# Patient Record
Sex: MALE | Race: WHITE | Employment: UNEMPLOYED | RURAL
[De-identification: names, ages, dates, MRNs, and addresses within clinical notes are randomized per-mention and may not be internally consistent; named-entity substitution may affect disease eponyms.]

---

## 2021-09-11 ENCOUNTER — APPOINTMENT (OUTPATIENT)
Dept: CT IMAGING | Age: 86
End: 2021-09-11
Attending: FAMILY MEDICINE
Payer: MEDICARE

## 2021-09-11 ENCOUNTER — HOSPITAL ENCOUNTER (OUTPATIENT)
Age: 86
Setting detail: OBSERVATION
Discharge: HOME | End: 2021-09-12
Attending: FAMILY MEDICINE | Admitting: FAMILY MEDICINE
Payer: MEDICARE

## 2021-09-11 ENCOUNTER — APPOINTMENT (OUTPATIENT)
Dept: GENERAL RADIOLOGY | Age: 86
End: 2021-09-11
Attending: FAMILY MEDICINE
Payer: MEDICARE

## 2021-09-11 DIAGNOSIS — R09.02 HYPOXIA: Primary | ICD-10-CM

## 2021-09-11 LAB
ALBUMIN SERPL-MCNC: 3.6 G/DL (ref 3.5–5)
ALBUMIN/GLOB SERPL: 1.3 {RATIO} (ref 1.1–2.2)
ALP SERPL-CCNC: 138 U/L (ref 45–117)
ALT SERPL-CCNC: 28 U/L (ref 12–78)
ANION GAP SERPL CALC-SCNC: 11 MMOL/L (ref 5–15)
AST SERPL-CCNC: 36 U/L (ref 15–37)
BASOPHILS # BLD: 0 K/UL (ref 0–0.1)
BASOPHILS NFR BLD: 0 % (ref 0–1)
BILIRUB SERPL-MCNC: 1.3 MG/DL (ref 0.2–1)
BUN SERPL-MCNC: 19 MG/DL (ref 6–20)
BUN/CREAT SERPL: 19 (ref 12–20)
CALCIUM SERPL-MCNC: 8.4 MG/DL (ref 8.5–10.1)
CHLORIDE SERPL-SCNC: 101 MMOL/L (ref 97–108)
CO2 SERPL-SCNC: 27 MMOL/L (ref 21–32)
CREAT SERPL-MCNC: 1 MG/DL (ref 0.7–1.3)
D DIMER PPP FEU-MCNC: 19.77 MG/L FEU (ref 0–0.65)
DIFFERENTIAL METHOD BLD: ABNORMAL
EOSINOPHIL # BLD: 0.2 K/UL (ref 0–0.4)
EOSINOPHIL NFR BLD: 2 % (ref 0–7)
ERYTHROCYTE [DISTWIDTH] IN BLOOD BY AUTOMATED COUNT: 14.2 % (ref 11.5–14.5)
FLUAV RNA SPEC QL NAA+PROBE: NOT DETECTED
FLUBV RNA SPEC QL NAA+PROBE: NOT DETECTED
GLOBULIN SER CALC-MCNC: 2.8 G/DL (ref 2–4)
GLUCOSE SERPL-MCNC: 181 MG/DL (ref 65–100)
HCT VFR BLD AUTO: 46.5 % (ref 36.6–50.3)
HGB BLD-MCNC: 16.3 G/DL (ref 12.1–17)
IMM GRANULOCYTES # BLD AUTO: 0.1 K/UL (ref 0–0.04)
IMM GRANULOCYTES NFR BLD AUTO: 1 % (ref 0–0.5)
LYMPHOCYTES # BLD: 1.1 K/UL (ref 0.8–3.5)
LYMPHOCYTES NFR BLD: 8 % (ref 12–49)
MCH RBC QN AUTO: 29.9 PG (ref 26–34)
MCHC RBC AUTO-ENTMCNC: 35.1 G/DL (ref 30–36.5)
MCV RBC AUTO: 85.2 FL (ref 80–99)
MONOCYTES # BLD: 0.5 K/UL (ref 0–1)
MONOCYTES NFR BLD: 4 % (ref 5–13)
NEUTS SEG # BLD: 10.6 K/UL (ref 1.8–8)
NEUTS SEG NFR BLD: 85 % (ref 32–75)
NRBC # BLD: 0 K/UL (ref 0–0.01)
NRBC BLD-RTO: 0 PER 100 WBC
PLATELET # BLD AUTO: 179 K/UL (ref 150–400)
PMV BLD AUTO: 10.4 FL (ref 8.9–12.9)
POTASSIUM SERPL-SCNC: 3.9 MMOL/L (ref 3.5–5.1)
PROT SERPL-MCNC: 6.4 G/DL (ref 6.4–8.2)
RBC # BLD AUTO: 5.46 M/UL (ref 4.1–5.7)
SARS-COV-2, COV2: NOT DETECTED
SODIUM SERPL-SCNC: 139 MMOL/L (ref 136–145)
TROPONIN I SERPL-MCNC: <0.05 NG/ML
TROPONIN I SERPL-MCNC: <0.05 NG/ML
WBC # BLD AUTO: 12.5 K/UL (ref 4.1–11.1)

## 2021-09-11 PROCEDURE — 99285 EMERGENCY DEPT VISIT HI MDM: CPT

## 2021-09-11 PROCEDURE — 87636 SARSCOV2 & INF A&B AMP PRB: CPT

## 2021-09-11 PROCEDURE — 36415 COLL VENOUS BLD VENIPUNCTURE: CPT

## 2021-09-11 PROCEDURE — 93005 ELECTROCARDIOGRAM TRACING: CPT

## 2021-09-11 PROCEDURE — 80053 COMPREHEN METABOLIC PANEL: CPT

## 2021-09-11 PROCEDURE — 71275 CT ANGIOGRAPHY CHEST: CPT

## 2021-09-11 PROCEDURE — 71045 X-RAY EXAM CHEST 1 VIEW: CPT

## 2021-09-11 PROCEDURE — 85379 FIBRIN DEGRADATION QUANT: CPT

## 2021-09-11 PROCEDURE — 74011000636 HC RX REV CODE- 636: Performed by: FAMILY MEDICINE

## 2021-09-11 PROCEDURE — 84484 ASSAY OF TROPONIN QUANT: CPT

## 2021-09-11 PROCEDURE — 85025 COMPLETE CBC W/AUTO DIFF WBC: CPT

## 2021-09-11 RX ORDER — SODIUM CHLORIDE 0.9 % (FLUSH) 0.9 %
5-10 SYRINGE (ML) INJECTION ONCE
Status: COMPLETED | OUTPATIENT
Start: 2021-09-11 | End: 2021-09-11

## 2021-09-11 RX ORDER — ATORVASTATIN CALCIUM 80 MG/1
80 TABLET, FILM COATED ORAL DAILY
COMMUNITY

## 2021-09-11 RX ORDER — AMLODIPINE BESYLATE 5 MG/1
5 TABLET ORAL DAILY
COMMUNITY

## 2021-09-11 RX ORDER — TAMSULOSIN HYDROCHLORIDE 0.4 MG/1
0.4 CAPSULE ORAL DAILY
COMMUNITY

## 2021-09-11 RX ORDER — ASPIRIN 325 MG
325 TABLET ORAL DAILY
COMMUNITY

## 2021-09-11 RX ORDER — FINASTERIDE 5 MG/1
5 TABLET, FILM COATED ORAL DAILY
COMMUNITY

## 2021-09-11 RX ADMIN — Medication 10 ML: at 01:25

## 2021-09-11 RX ADMIN — IOPAMIDOL 100 ML: 755 INJECTION, SOLUTION INTRAVENOUS at 22:31

## 2021-09-11 NOTE — Clinical Note
Patient Class[de-identified] OBSERVATION [104]   Type of Bed: Medical [8]   Cardiac Monitoring Required?: Yes   Reason for Observation: hypoxia   Admitting Diagnosis: Hypoxia [528640]   Admitting Physician: Temitope Flores   Attending Physician: Temitope Flores

## 2021-09-12 VITALS
WEIGHT: 187.2 LBS | HEART RATE: 60 BPM | SYSTOLIC BLOOD PRESSURE: 132 MMHG | HEIGHT: 70 IN | RESPIRATION RATE: 20 BRPM | TEMPERATURE: 97.5 F | OXYGEN SATURATION: 94 % | BODY MASS INDEX: 26.8 KG/M2 | DIASTOLIC BLOOD PRESSURE: 62 MMHG

## 2021-09-12 PROBLEM — I10 HTN (HYPERTENSION): Chronic | Status: ACTIVE | Noted: 2021-09-12

## 2021-09-12 PROBLEM — R09.02 HYPOXIA: Status: ACTIVE | Noted: 2021-09-12

## 2021-09-12 PROBLEM — I67.9 CVD (CEREBROVASCULAR DISEASE): Chronic | Status: ACTIVE | Noted: 2021-09-12

## 2021-09-12 PROBLEM — N40.0 BPH (BENIGN PROSTATIC HYPERPLASIA): Chronic | Status: ACTIVE | Noted: 2021-09-12

## 2021-09-12 PROBLEM — E78.5 HYPERLIPEMIA: Chronic | Status: ACTIVE | Noted: 2021-09-12

## 2021-09-12 LAB
ATRIAL RATE: 95 BPM
CALCULATED P AXIS, ECG09: 59 DEGREES
CALCULATED R AXIS, ECG10: 92 DEGREES
CALCULATED T AXIS, ECG11: 51 DEGREES
DIAGNOSIS, 93000: NORMAL
ERYTHROCYTE [DISTWIDTH] IN BLOOD BY AUTOMATED COUNT: 14.7 % (ref 11.5–14.5)
HCT VFR BLD AUTO: 40.7 % (ref 36.6–50.3)
HGB BLD-MCNC: 14 G/DL (ref 12.1–17)
MCH RBC QN AUTO: 29.6 PG (ref 26–34)
MCHC RBC AUTO-ENTMCNC: 34.4 G/DL (ref 30–36.5)
MCV RBC AUTO: 86 FL (ref 80–99)
P-R INTERVAL, ECG05: 196 MS
PLATELET # BLD AUTO: 139 K/UL (ref 150–400)
PMV BLD AUTO: 10 FL (ref 8.9–12.9)
Q-T INTERVAL, ECG07: 392 MS
QRS DURATION, ECG06: 146 MS
QTC CALCULATION (BEZET), ECG08: 492 MS
RBC # BLD AUTO: 4.73 M/UL (ref 4.1–5.7)
TROPONIN I SERPL-MCNC: <0.05 NG/ML
VENTRICULAR RATE, ECG03: 95 BPM
WBC # BLD AUTO: 8.4 K/UL (ref 4.1–11.1)

## 2021-09-12 PROCEDURE — 36415 COLL VENOUS BLD VENIPUNCTURE: CPT

## 2021-09-12 PROCEDURE — 85027 COMPLETE CBC AUTOMATED: CPT

## 2021-09-12 PROCEDURE — 94760 N-INVAS EAR/PLS OXIMETRY 1: CPT

## 2021-09-12 PROCEDURE — 74011250637 HC RX REV CODE- 250/637: Performed by: INTERNAL MEDICINE

## 2021-09-12 PROCEDURE — 77010033678 HC OXYGEN DAILY

## 2021-09-12 PROCEDURE — 94761 N-INVAS EAR/PLS OXIMETRY MLT: CPT

## 2021-09-12 PROCEDURE — 96372 THER/PROPH/DIAG INJ SC/IM: CPT

## 2021-09-12 PROCEDURE — 84484 ASSAY OF TROPONIN QUANT: CPT

## 2021-09-12 PROCEDURE — 74011250636 HC RX REV CODE- 250/636: Performed by: INTERNAL MEDICINE

## 2021-09-12 PROCEDURE — 99218 HC RM OBSERVATION: CPT

## 2021-09-12 RX ORDER — SODIUM CHLORIDE 0.9 % (FLUSH) 0.9 %
5-40 SYRINGE (ML) INJECTION AS NEEDED
Status: DISCONTINUED | OUTPATIENT
Start: 2021-09-12 | End: 2021-09-12 | Stop reason: HOSPADM

## 2021-09-12 RX ORDER — GUAIFENESIN 600 MG/1
600 TABLET, EXTENDED RELEASE ORAL 2 TIMES DAILY
Qty: 30 TABLET | Refills: 0 | Status: SHIPPED | OUTPATIENT
Start: 2021-09-12

## 2021-09-12 RX ORDER — ASPIRIN 325 MG
325 TABLET ORAL DAILY
Status: DISCONTINUED | OUTPATIENT
Start: 2021-09-12 | End: 2021-09-12 | Stop reason: HOSPADM

## 2021-09-12 RX ORDER — FINASTERIDE 5 MG/1
5 TABLET, FILM COATED ORAL DAILY
Status: DISCONTINUED | OUTPATIENT
Start: 2021-09-12 | End: 2021-09-12 | Stop reason: HOSPADM

## 2021-09-12 RX ORDER — ACETAMINOPHEN 325 MG/1
650 TABLET ORAL
Status: DISCONTINUED | OUTPATIENT
Start: 2021-09-12 | End: 2021-09-12 | Stop reason: HOSPADM

## 2021-09-12 RX ORDER — ENOXAPARIN SODIUM 100 MG/ML
40 INJECTION SUBCUTANEOUS DAILY
Status: DISCONTINUED | OUTPATIENT
Start: 2021-09-12 | End: 2021-09-12 | Stop reason: HOSPADM

## 2021-09-12 RX ORDER — AMLODIPINE BESYLATE 5 MG/1
5 TABLET ORAL DAILY
Status: DISCONTINUED | OUTPATIENT
Start: 2021-09-12 | End: 2021-09-12 | Stop reason: HOSPADM

## 2021-09-12 RX ORDER — NITROGLYCERIN 0.4 MG/1
0.4 TABLET SUBLINGUAL
Qty: 25 TABLET | Refills: 0 | Status: SHIPPED | OUTPATIENT
Start: 2021-09-12

## 2021-09-12 RX ORDER — TAMSULOSIN HYDROCHLORIDE 0.4 MG/1
0.4 CAPSULE ORAL DAILY
Status: DISCONTINUED | OUTPATIENT
Start: 2021-09-12 | End: 2021-09-12 | Stop reason: HOSPADM

## 2021-09-12 RX ORDER — SODIUM CHLORIDE 0.9 % (FLUSH) 0.9 %
5-40 SYRINGE (ML) INJECTION EVERY 8 HOURS
Status: DISCONTINUED | OUTPATIENT
Start: 2021-09-12 | End: 2021-09-12 | Stop reason: HOSPADM

## 2021-09-12 RX ORDER — ATORVASTATIN CALCIUM 40 MG/1
80 TABLET, FILM COATED ORAL DAILY
Status: DISCONTINUED | OUTPATIENT
Start: 2021-09-12 | End: 2021-09-12 | Stop reason: HOSPADM

## 2021-09-12 RX ORDER — POLYETHYLENE GLYCOL 3350 17 G/17G
17 POWDER, FOR SOLUTION ORAL DAILY PRN
Status: DISCONTINUED | OUTPATIENT
Start: 2021-09-12 | End: 2021-09-12 | Stop reason: HOSPADM

## 2021-09-12 RX ADMIN — ATORVASTATIN CALCIUM 80 MG: 40 TABLET, FILM COATED ORAL at 11:36

## 2021-09-12 RX ADMIN — AMLODIPINE BESYLATE 5 MG: 5 TABLET ORAL at 11:37

## 2021-09-12 RX ADMIN — ENOXAPARIN SODIUM 40 MG: 40 INJECTION SUBCUTANEOUS at 11:37

## 2021-09-12 RX ADMIN — TAMSULOSIN HYDROCHLORIDE 0.4 MG: 0.4 CAPSULE ORAL at 11:37

## 2021-09-12 RX ADMIN — FINASTERIDE 5 MG: 5 TABLET, FILM COATED ORAL at 11:36

## 2021-09-12 RX ADMIN — ASPIRIN 325 MG ORAL TABLET 325 MG: 325 PILL ORAL at 11:36

## 2021-09-12 NOTE — ROUTINE PROCESS
Discharge instructions reviewed with patient and son in law  Personal belongings returned: nadeem  Home meds returned: nadeem  To front entrance via wheelchair  Discharged home with  family @ [phone]

## 2021-09-12 NOTE — H&P
[facility]   Admission History & Physical        2021 10:14 AM  Patient: Vianney Wilde 1933  PCP: Florentin Alvarez, Not On File, MD    HISTORY  Chief Complaint:   Chief Complaint   Patient presents with    Shortness of Breath    Chest Pain       HPI: 80 y.o. male presenting for admission to [facility] for further evaluation and treatment for Hypoxia. He  has no past medical history on file. Patient was brought into the ED shortly after midnight by family after he had suddenly become dyspneic and cyanotic while visiting their home. The friend who brought him in was a family practice physician from [place]. Full details are not documented on the chart at this time however the report was that he suddenly became cyanotic with respiratory distress and was brought into the ED. Further discussion with the patient this morning notes a significant history for vascular disease on daily treatment. He only moved to [place] from [place] about 1 month ago to live with family here. 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 The final event apparently took several weeks to subside. He is ambulatory at this point with minimal assistance. He does not smoke tobacco and does not have any known respiratory disease. There is not been any observed blood loss. He denied complaints of chest pain and a series of troponins were normal.  The D-dimer was elevated and a CTA of the chest was negative for PE or other disease. He was treated with nasal oxygen and symptomatically improved. The patient is admitted for further monitoring, testing, observation at this point. Patient has significant hearing deficit and uses hearing aids. Patient had been living alone up until recently, changes had to be made following the death of his spouse as well as his cerebrovascular events.   Patient denies any complaints of shortness of breath or pains this morning. His appetite is good and he is at the bedside eating breakfast.  Patient has had no bowel or bladder complaints. He denies complaints of dyspepsia or constipation/diarrhea. He has not observed any bleeding. Patient notes some memory difficulty and is not able to provide any additional details at this point. Past Medical History:  CVA x 3 - [place]  HTN  Hyperlipidemia  BPH    Past Surgical History:  Carotid Endarterectomy  Repair AAA  Sinus surgery  Cholecystectomy  Inguinal hernia    Medication:  Prior to Admission medications    Medication Sig Start Date End Date Taking? Authorizing Provider   amLODIPine (Norvasc) 5 mg tablet Take 5 mg by mouth daily. Gopal Vogt MD   aspirin (ASPIRIN) 325 mg tablet Take 325 mg by mouth daily. Gopal Vogt MD   tamsulosin (Flomax) 0.4 mg capsule Take 0.4 mg by mouth daily. Gopal Vogt MD   finasteride (Proscar) 5 mg tablet Take 5 mg by mouth daily. Gopal Vogt MD   atorvastatin (Lipitor) 80 mg tablet Take 80 mg by mouth daily. Gopal Vogt MD       Allergies:  No Known Allergies    Social History:  Social History     Tobacco Use    Smoking status: Not on file   Substance Use Topics    Alcohol use: Not on file    Drug use: Not on file       Family History:  No family history on file.     ROS:  Total of 12 systems reviewed as follows:  POSITIVE= bolded text  Negative = text not bolded       General:  fever, chills, sweats, generalized weakness, weight loss/gain, loss of appetite   Eyes:    blurred vision, eye pain, loss of vision, double vision  ENT:    rhinorrhea, pharyngitis, poor hearing acuity  Respiratory:  cough, sputum production, SOB, LIU, wheezing, pleuritic pain   Cardiology:   chest pain, palpitations, orthopnea, PND, edema, syncope   Gastrointestinal:  abdominal pain , N/V, diarrhea, dysphagia, constipation, bleeding   Genitourinary:  frequency, urgency, dysuria, hematuria, incontinence, prostatism   Muskuloskeletal: arthralgia, myalgia, back pain  Hematology:   easy bruising, nose or gum bleeding, lymphadenopathy   Dermatological: rash, ulceration, pruritis, color change / jaundice  Endocrine:   hot flashes or polydipsia   Neurological:  headache, dizziness, confusion, focal weakness, paresthesia, speech difficulties, memory loss, gait difficulty  Psychological: feelings of anxiety, depression, agitation      PHYSICAL EXAM:  Patient Vitals for the past 24 hrs:   Temp Pulse Resp BP SpO2   09/12/21 0855     95 %   09/12/21 0829 98.2 °F (36.8 °C) 78 20 (!) 158/80 94 %   09/12/21 0400 98.6 °F (37 °C) 69 18 139/80 100 %   09/12/21 0127 98.3 °F (36.8 °C) 72 20 (!) 144/68 97 %   09/12/21 0100 98.3 °F (36.8 °C) 78 21 132/68 97 %   09/11/21 2300  88 24 126/74 98 %   09/11/21 2200  89  116/68 96 %   09/11/21 2100  92 24 124/71 96 %   09/11/21 1938 98 °F (36.7 °C) 99  100/63 (!) 88 %       General:    Alert, cooperative, no distress, appears stated age. HEENT: Atraumatic, anicteric sclerae, pink conjunctivae     No oral ulcers, mucosa moist, throat clear, dentition fair  Neck:  Supple, symmetrical;   thyroid non tender  Lungs:   Clear to auscultation bilaterally. No wheezing or rhonchi. No rales. Chest wall:  No tenderness. No accessory muscle use. Heart:   Regular rhythm. No  murmur. No edema  Abdomen:   Soft, non-tender. Not distended. Bowel sounds normal  Extremities: No cyanosis. No clubbing      Capillary refill normal,  Radial pulse 2+,  DP 1+  Skin:     Not pale. Not jaundiced. No rashes   Psych:  Not depressed. Not anxious or agitated. Neurologic: EOMs intact. No facial asymmetry. No aphasia or slurred speech. Symmetrical strength, Sensation grossly intact.  Alert and oriented x 2    Lab Data Reviewed:    Recent Results (from the past 24 hour(s))   CBC WITH AUTOMATED DIFF    Collection Time: 09/11/21  7:50 PM   Result Value Ref Range    WBC 12.5 (H) 4.1 - 11.1 K/uL    RBC 5.46 4.10 - 5.70 M/uL    HGB 16.3 12.1 - 17.0 g/dL    HCT 46.5 36.6 - 50.3 %    MCV 85.2 80.0 - 99.0 FL    MCH 29.9 26.0 - 34.0 PG    MCHC 35.1 30.0 - 36.5 g/dL    RDW 14.2 11.5 - 14.5 %    PLATELET 680 324 - 620 K/uL    MPV 10.4 8.9 - 12.9 FL    NRBC 0.0 0  WBC    ABSOLUTE NRBC 0.00 0.00 - 0.01 K/uL    NEUTROPHILS 85 (H) 32 - 75 %    LYMPHOCYTES 8 (L) 12 - 49 %    MONOCYTES 4 (L) 5 - 13 %    EOSINOPHILS 2 0 - 7 %    BASOPHILS 0 0 - 1 %    IMMATURE GRANULOCYTES 1 (H) 0.0 - 0.5 %    ABS. NEUTROPHILS 10.6 (H) 1.8 - 8.0 K/UL    ABS. LYMPHOCYTES 1.1 0.8 - 3.5 K/UL    ABS. MONOCYTES 0.5 0.0 - 1.0 K/UL    ABS. EOSINOPHILS 0.2 0.0 - 0.4 K/UL    ABS. BASOPHILS 0.0 0.0 - 0.1 K/UL    ABS. IMM. GRANS. 0.1 (H) 0.00 - 0.04 K/UL    DF AUTOMATED     METABOLIC PANEL, COMPREHENSIVE    Collection Time: 09/11/21  7:50 PM   Result Value Ref Range    Sodium 139 136 - 145 mmol/L    Potassium 3.9 3.5 - 5.1 mmol/L    Chloride 101 97 - 108 mmol/L    CO2 27 21 - 32 mmol/L    Anion gap 11 5 - 15 mmol/L    Glucose 181 (H) 65 - 100 mg/dL    BUN 19 6 - 20 MG/DL    Creatinine 1.00 0.70 - 1.30 MG/DL    BUN/Creatinine ratio 19 12 - 20      GFR est AA >60 >60 ml/min/1.73m2    GFR est non-AA >60 >60 ml/min/1.73m2    Calcium 8.4 (L) 8.5 - 10.1 MG/DL    Bilirubin, total 1.3 (H) 0.2 - 1.0 MG/DL    ALT (SGPT) 28 12 - 78 U/L    AST (SGOT) 36 15 - 37 U/L    Alk.  phosphatase 138 (H) 45 - 117 U/L    Protein, total 6.4 6.4 - 8.2 g/dL    Albumin 3.6 3.5 - 5.0 g/dL    Globulin 2.8 2.0 - 4.0 g/dL    A-G Ratio 1.3 1.1 - 2.2     TROPONIN I    Collection Time: 09/11/21  7:50 PM   Result Value Ref Range    Troponin-I, Qt. <0.05 <0.05 ng/mL   D DIMER    Collection Time: 09/11/21  7:50 PM   Result Value Ref Range    D-dimer 19.77 (H) 0.00 - 0.65 mg/L FEU   EKG, 12 LEAD, INITIAL    Collection Time: 09/11/21  8:11 PM   Result Value Ref Range    Ventricular Rate 95 BPM    Atrial Rate 95 BPM    P-R Interval 196 ms    QRS Duration 146 ms    Q-T Interval 392 ms    QTC Calculation (Bezet) 492 ms    Calculated P Axis 59 degrees    Calculated R Axis 92 degrees    Calculated T Axis 51 degrees    Diagnosis       Normal sinus rhythm  Right bundle branch block  Abnormal ECG  No previous ECGs available     COVID-19 WITH INFLUENZA A/B    Collection Time: 09/11/21  9:10 PM   Result Value Ref Range    SARS-CoV-2 Not detected NOTD      Influenza A by PCR Not detected NOTD      Influenza B by PCR Not detected NOTD     TROPONIN I    Collection Time: 09/11/21 11:30 PM   Result Value Ref Range    Troponin-I, Qt. <0.05 <0.05 ng/mL       EKG: NSR 95 bpm, RBBB, No prev    Radiology:  CTA CHEST W OR W WO CONT   Final Result   1. Negative for pulmonary embolus. .   2. No evidence of acute process. XR CHEST PORT   Final Result   No acute process seen          Care Plan discussed with:   Patient x    Family     RN x         Consultant      Expected  Disposition:   Home with Family x   HH/PT/OT/RN    SNF/LTC    YUKO      TOTAL TIME:  61 Minutes      Comments    x Reviewed previous records   >50% of visit spent in counseling and coordination of care x Discussion with patient and/or family and questions answered       _______________________________________________________  Given the patient's current clinical presentation, I have a high level of concern for decompensation if discharged from the emergency department. Complex decision making was performed, which includes reviewing the patient's available past medical records, laboratory results, and x-ray films. My assessment of this patient's clinical condition and my plan of care is as follows. ASSESSMENT / PLAN    Principal Problem:    Hypoxia (9/12/2021)  Sudden event was a witness by his caregivers  He was described as being cyanotic and dyspneic. Hypoxia was documented on presentation which responded to nasal oxygen. Chest x-ray as well as CTA of the chest showed no pulmonary abnormalities.   Patient has had known vascular disease with the series of prior CVAs. He has been managed with antihypertensive, statin, aspirin and has moved to live with relatives  The patient may have had some minor aspiration event which cleared  Will reassess a CBC to rule out signs of an acute bleed  Patient has been monitored on telemetry without signs of significant arrhythmiabigeminy was recorded  Serial troponins seem to rule out an acute coronary eventwill repeat once this morning  Oxygen has been discontinued with current pulse oximetry 95% room air.   Patient's activity will be advanced, patient monitored, disposition consider this evening    Active Problems:    CVD (cerebrovascular disease) (9/12/2021)    HTN (hypertension) (9/12/2021)    Hyperlipemia (9/12/2021)  Continue current treatment with Norvasc, aspirin, Lipitor      BPH (benign prostatic hyperplasia) (9/12/2021)  Continue current treatment with Proscar and Flomax        SAFETY:   Code Status:Full  DVT prophylaxis:Lovenox  Stress Ulcer prophylaxis: Not Indicated  Bladder catheter:no  Family Contact Info:  Primary Emergency Contact: [address], Home Phone: [phone]  Bedded: [facility] Room 200/01  Disposition: TBD, likely home when stable  Admission status:  Observation    -Tentative plan of care discussed with patient / family, who demonstrated understanding and is in agreement to the above  -Case was reviewed with the ED Provider, Lavon Curling, MD Rise Comings, MD  [facility] Hospitalist  [phone]

## 2021-09-12 NOTE — ED PROVIDER NOTES
HPI  Pt is an 81 yo man with a h/o htn, CEA, and tobacco use (quit 20 years ago) who comes to the ED by EMS because of an episode of hypoxia and cyanosis. His son in law, a family physician in [place], reports that the patient has had a cold for about 2-3 days. He was sitting at home with daughter and son in law when he had an acute episode of cyanosis and shortness of breath. It lasted for several minutes, and when EMS arrived, they found him to have SpO2 in the mid 80's. They put him on a 100% NRB, and he came up to the 94-95% range. His mental status seems to have suffered temporarily, according to family; pt is normally much more talkative and responsive than he is now. He has had his 3rd COVID vaccine, 2 weeks ago. No exposure to [place] recently. No past medical history on file. No past surgical history on file. No family history on file. Social History     Socioeconomic History    Marital status:      Spouse name: Not on file    Number of children: Not on file    Years of education: Not on file    Highest education level: Not on file   Occupational History    Not on file   Tobacco Use    Smoking status: Not on file   Substance and Sexual Activity    Alcohol use: Not on file    Drug use: Not on file    Sexual activity: Not on file   Other Topics Concern    Not on file   Social History Narrative    Not on file     Social Determinants of Health     Financial Resource Strain:     Difficulty of Paying Living Expenses:    Food Insecurity:     Worried About Running Out of Food in the Last Year:     [address] in the Last Year:    Transportation Needs:     Lack of Transportation (Medical):      Lack of Transportation (Non-Medical):    Physical Activity:     Days of Exercise per Week:     Minutes of Exercise per Session:    Stress:     Feeling of Stress :    Social Connections:     Frequency of Communication with Friends and Family:     Frequency of Social Gatherings with Friends and Family:     Attends Voodoo Services:     Active Member of Clubs or Organizations:     Attends Club or Organization Meetings:     Marital Status:    Intimate Partner Violence:     Fear of Current or Ex-Partner:     Emotionally Abused:     Physically Abused:     Sexually Abused: ALLERGIES: Patient has no known allergies. Review of Systems   Constitutional: Negative for appetite change and fever. HENT: Positive for congestion, rhinorrhea and trouble swallowing. Negative for sinus pressure and sore throat. Eyes: Negative for photophobia and visual disturbance. Respiratory: Positive for cough, chest tightness, shortness of breath, wheezing and stridor. Cardiovascular: Negative for chest pain and leg swelling. Gastrointestinal: Negative for abdominal pain, diarrhea, nausea and vomiting. Genitourinary: Negative for dysuria, frequency and hematuria. Musculoskeletal: Negative for arthralgias, back pain and myalgias. Skin: Negative for rash. Allergic/Immunologic: Negative for environmental allergies. Neurological: Positive for weakness. Negative for dizziness, light-headedness and headaches. Hematological: Does not bruise/bleed easily. Vitals:    09/12/21 0829 09/12/21 0855 09/12/21 1057 09/12/21 1133   BP: (!) 158/80   132/62   Pulse: 78   60   Resp: 20   20   Temp: 98.2 °F (36.8 °C)   97.5 °F (36.4 °C)   SpO2: 94% 95% 95% 94%   Weight:       Height:                Physical Exam  Vitals reviewed. Constitutional:       General: He is not in acute distress. Appearance: He is well-developed. He is not diaphoretic. HENT:      Head: Normocephalic and atraumatic. Right Ear: External ear normal.      Left Ear: External ear normal.      Mouth/Throat:      Pharynx: No oropharyngeal exudate. Eyes:      General: No scleral icterus. Right eye: No discharge. Left eye: No discharge.       Conjunctiva/sclera: Conjunctivae normal.      Pupils: Pupils are equal, round, and reactive to light. Neck:      Thyroid: No thyromegaly. Vascular: No JVD. Trachea: No tracheal deviation. Cardiovascular:      Rate and Rhythm: Normal rate and regular rhythm. Heart sounds: Normal heart sounds. No murmur heard. No friction rub. No gallop. Pulmonary:      Effort: Pulmonary effort is normal. No respiratory distress. Breath sounds: Examination of the right-upper field reveals wheezing. Examination of the left-upper field reveals wheezing. Examination of the right-middle field reveals wheezing and rhonchi. Examination of the left-middle field reveals wheezing and rhonchi. Examination of the right-lower field reveals decreased breath sounds. Examination of the left-lower field reveals decreased breath sounds. Decreased breath sounds, wheezing and rhonchi present. No rales. Chest:      Chest wall: No tenderness. Abdominal:      General: Bowel sounds are normal. There is no distension. Palpations: Abdomen is soft. Tenderness: There is no abdominal tenderness. There is no rebound. Musculoskeletal:         General: No tenderness or deformity. Normal range of motion. Cervical back: Normal range of motion and neck supple. Skin:     General: Skin is warm and dry. Neurological:      General: No focal deficit present. Cranial Nerves: No cranial nerve deficit. Coordination: Coordination normal.      Deep Tendon Reflexes: Reflexes are normal and symmetric. Comments: Somewhat slow to respond. Answers simple questions. MDM  Pt with h/o AAA (repaired), CVA and CEA but no h/o COPD comes to the ED with an episode of hypoxia, still requiring oxygen at 5 liters 2 hours later. CTPA, cardiac workup, CXR all negative for PE, MI, or pneumonia. COVID negative. Difficult to know why patient had such an episode of hypoxia. Will speak to Dr. Lyla Cosme about admitting for observation.       ED Course as of Sep 12 1932   Sat Sep 11, 2021 2030 D-dimer 19.8. Will obtain CTPA. [VG]   8016 CTPA negative. Will admit if repeat troponin negative. [VG]      ED Course User Index  [VG] Patrick Shetty MD             Results for Evens Randall (MRN [id number]) as of 9/13/2021 06:41   Ref. Range 9/11/2021 19:50   WBC Latest Ref Range: 4.1 - 11.1 K/uL 12.5 (H)   NRBC Latest Ref Range: 0  WBC 0.0   RBC Latest Ref Range: 4.10 - 5.70 M/uL 5.46   HGB Latest Ref Range: 12.1 - 17.0 g/dL 16.3   HCT Latest Ref Range: 36.6 - 50.3 % 46.5   MCV Latest Ref Range: 80.0 - 99.0 FL 85.2   MCH Latest Ref Range: 26.0 - 34.0 PG 29.9   MCHC Latest Ref Range: 30.0 - 36.5 g/dL 35.1   RDW Latest Ref Range: 11.5 - 14.5 % 14.2   PLATELET Latest Ref Range: 150 - 400 K/uL 179   MPV Latest Ref Range: 8.9 - 12.9 FL 10.4   NEUTROPHILS Latest Ref Range: 32 - 75 % 85 (H)   LYMPHOCYTES Latest Ref Range: 12 - 49 % 8 (L)   MONOCYTES Latest Ref Range: 5 - 13 % 4 (L)   EOSINOPHILS Latest Ref Range: 0 - 7 % 2   BASOPHILS Latest Ref Range: 0 - 1 % 0   IMMATURE GRANULOCYTES Latest Ref Range: 0.0 - 0.5 % 1 (H)   DF Latest Units:   AUTOMATED   ABSOLUTE NRBC Latest Ref Range: 0.00 - 0.01 K/uL 0.00   ABS. NEUTROPHILS Latest Ref Range: 1.8 - 8.0 K/UL 10.6 (H)   ABS. IMM. GRANS. Latest Ref Range: 0.00 - 0.04 K/UL 0.1 (H)   ABS. LYMPHOCYTES Latest Ref Range: 0.8 - 3.5 K/UL 1.1   ABS. MONOCYTES Latest Ref Range: 0.0 - 1.0 K/UL 0.5   ABS. EOSINOPHILS Latest Ref Range: 0.0 - 0.4 K/UL 0.2   ABS.  BASOPHILS Latest Ref Range: 0.0 - 0.1 K/UL 0.0   D-dimer Latest Ref Range: 0.00 - 0.65 mg/L FEU 19.77 (H)   Sodium Latest Ref Range: 136 - 145 mmol/L 139   Potassium Latest Ref Range: 3.5 - 5.1 mmol/L 3.9   Chloride Latest Ref Range: 97 - 108 mmol/L 101   CO2 Latest Ref Range: 21 - 32 mmol/L 27   Anion gap Latest Ref Range: 5 - 15 mmol/L 11   Glucose Latest Ref Range: 65 - 100 mg/dL 181 (H)   BUN Latest Ref Range: 6 - 20 MG/DL 19   Creatinine Latest Ref Range: 0.70 - 1.30 MG/DL 1.00   BUN/Creatinine ratio Latest Ref Range: 12 - 20   19   Calcium Latest Ref Range: 8.5 - 10.1 MG/DL 8.4 (L)   GFR est non-AA Latest Ref Range: >60 ml/min/1.73m2 >60   GFR est AA Latest Ref Range: >60 ml/min/1.73m2 >60   Bilirubin, total Latest Ref Range: 0.2 - 1.0 MG/DL 1.3 (H)   Protein, total Latest Ref Range: 6.4 - 8.2 g/dL 6.4   Albumin Latest Ref Range: 3.5 - 5.0 g/dL 3.6   Globulin Latest Ref Range: 2.0 - 4.0 g/dL 2.8   A-G Ratio Latest Ref Range: 1.1 - 2.2   1.3   ALT Latest Ref Range: 12 - 78 U/L 28   AST Latest Ref Range: 15 - 37 U/L 36   Alk. phosphatase Latest Ref Range: 45 - 117 U/L 138 (H)   Troponin-I, Qt. Latest Ref Range: <0.05 ng/mL <0.05     SARS-CoV-2 NOTD   Not detected    Comment: Not Detected results do not preclude SARS-CoV-2 infection and should not be used as the sole basis for patient management decisions. Results must be combined with clinical observations, patient history, and epidemiological information. Influenza A by PCR NOTD   Not detected    Influenza B by PCR NOTD   Not detected    Comment: Testing was performed using sofiya Halima SARS-CoV-2 and Influenza A/B nucleic acid assay. This test is a multiplex Real-Time Reverse Transcriptase Polymerase Chain Reaction (RT-PCR) based in vitro diagnostic test intended for the qualitative detection of nucleic acids from SARS-CoV-2, Influenza A, and Influenza B in nasopharyngeal and nasal swab specimens for use under the FDA's Emergency Use Authorization (EUA) only        EXAM: XR CHEST PORT     INDICATION: dyspnea     COMPARISON: None.     FINDINGS: A portable AP radiograph of the chest was obtained at 1949 hours. .  The lungs are clear. The cardiac and mediastinal contours and pulmonary  vascularity are remarkable for mild tortuosity of the descending aorta.  The left  distal clavicle is surgically absent.     IMPRESSION  No acute process seen        EXAM: CTA CHEST W OR W WO CONT     INDICATION: rule out PE. DDimer 19     COMPARISON: Radiographs same day.     CONTRAST: 100 mL of Isovue-370.     TECHNIQUE:   Precontrast  images were obtained to localize the volume for acquisition. Multislice helical CT arteriography was performed from the diaphragm to the  thoracic inlet during uneventful rapid bolus intravenous contrast  administration. Lung and soft tissue windows were generated. Coronal and  sagittal images were generated and 3D post processing consisting of coronal  maximum intensity images was performed. CT dose reduction was achieved through  use of a standardized protocol tailored for this examination and automatic  exposure control for dose modulation.     FINDINGS:  Images are partially degraded secondary to respiratory motion. THYROID: No nodule. MEDIASTINUM: No mass or lymphadenopathy. ANUSHA: No mass or lymphadenopathy. THORACIC AORTA: No dissection or aneurysm. PULMONARY ARTERIES: Normal in caliber. The pulmonary arteries are well enhanced. No evidence of pulmonary embolus. TRACHEA/BRONCHI: Patent. ESOPHAGUS: No wall thickening or dilatation. HEART: Normal in size. Coronary atherosclerotic calcifications are present. PLEURA: No effusion or pneumothorax. LUNGS: No nodule, mass, or airspace disease. INCIDENTALLY IMAGED UPPER ABDOMEN: There is a 9.3 cm cyst in the lateral left  kidney. There is a 3.1 cm cyst in the superior left kidney. There is a 2.0 cm  cyst in the anterior left kidney. The patient is status post cholecystectomy. BONES: No destructive bone lesion.     IMPRESSION  1. Negative for pulmonary embolus. .  2. No evidence of acute process. Imp: Hypoxia    ED Course: Pt stable in ED. Oxygen requirement remained at 5 liters for the 4 hours that he was here. He had no chest pain, no fevers, and his mental status improved steadily during the time that he was here. Case was discussed with Dr. Frida Phillips, who agreed that the patient was appropriate for admission to [facility]. Plan: Admit Telemetry, [facility].                   [place] Carlyon Mcardle, MD

## 2021-09-12 NOTE — DISCHARGE SUMMARY
[facility]  Hospitalist Discharge Summary    Patient ID:  Destinee Nina  [id number]  80 y.o.  1933    PCP on record: [facility] File, MD    Admit date: 2021  Discharge date and time: 2021     DISCHARGE DIAGNOSIS:    Principal Problem:    Hypoxia (2021)    Active Problems:    CVD (cerebrovascular disease) (2021)    HTN (hypertension) (2021)    Hyperlipemia (2021)    BPH (benign prostatic hyperplasia) (2021)    CONSULTATIONS:  None    Excerpted HPI from H&P of Clarisa Moore MD:  80 y.o. male presenting for admission to [facility] for further evaluation and treatment for Hypoxia. He  has no past medical history on file. Patient was brought into the ED shortly after midnight by family after he had suddenly become dyspneic and cyanotic while visiting their home. The friend who brought him in was a family practice physician from [facility]. Full details are not documented on the chart at this time however the report was that he suddenly became cyanotic with respiratory distress and was brought into the ED. Further discussion with the patient this morning notes a significant history for vascular disease on daily treatment. He only moved to [place] from [place] about 1 month ago to live with family here. 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 The final event apparently took several weeks to subside. He is ambulatory at this point with minimal assistance. He does not smoke tobacco and does not have any known respiratory disease. There is not been any observed blood loss. He denied complaints of chest pain and a series of troponins were normal.  The D-dimer was elevated and a CTA of the chest was negative for PE or other disease. He was treated with nasal oxygen and symptomatically improved.   The patient is admitted for further monitoring, testing, observation at this point.     Patient has significant hearing deficit and uses hearing aids. Patient had been living alone up until recently, changes had to be made following the death of his spouse as well as his cerebrovascular events. Patient denies any complaints of shortness of breath or pains this morning. His appetite is good and he is at the bedside eating breakfast.  Patient has had no bowel or bladder complaints. He denies complaints of dyspepsia or constipation/diarrhea. He has not observed any bleeding.     Patient notes some memory difficulty and is not able to provide any additional details at this point.    ______________________________________________________________________  DISCHARGE SUMMARY/HOSPITAL COURSE:  for full details see H&P, daily progress notes, labs, consult notes. Principal Problem:    Hypoxia (9/12/2021)  Sudden event that was witnessed by his caregivers / relative (MD)  He was described as being cyanotic and dyspneic - suddenly  EMS obtained pulse ox in 70s  Hypoxia was documented on presentation which responded to nasal oxygen. Required 5 l/min NC initially  Chest x-ray as well as CTA of the chest showed no pulmonary abnormalities. Patient has had known vascular disease with the series of prior CVAs. He has been managed with antihypertensive, statin, aspirin and has moved to live with relatives  The patient may have had some minor aspiration event which cleared  Pt may have had a mucous plug a/w recent URI illness  - had received Augmentin  Was not on drying antihistamine  Serial CBCs to rule out signs of an acute bleed  Patient has been monitored on telemetry without signs of significant arrhythmiabigeminy was recorded  Serial troponins  X 3 seem to rule out an acute coronary event  Oxygen has been discontinued with current pulse oximetry 95% room air.   Patient's activity will be advanced, patient monitored, disposition consider this evening    Care discussed with son-in-law (MD)  Suspect possible Mucous plug vs Acute angina  Will Rx NTG sl prn for recurrent c/o  Plan Cardiology f/u with [facility] Nuclear Scan to assess for CAD  Add Mucinex to thin mucous to avoid mucous plug  Consider med changes - add BetaBlocker / Nitrate (stop Norvasc) pending cardiac w/u     Active Problems:    CVD (cerebrovascular disease) (9/12/2021)    HTN (hypertension) (9/12/2021)    Hyperlipemia (9/12/2021)  Continue current treatment with Norvasc, aspirin, Lipitor       BPH (benign prostatic hyperplasia) (9/12/2021)  Continue current treatment with Proscar and Flomax         _______________________________________________________________________  Patient seen and examined by me on discharge day. Pertinent Findings:  Visit Vitals  /62   Pulse 60   Temp 97.5 °F (36.4 °C)   Resp 20   Ht 5' 10\" (1.778 m)   Wt 84.9 kg (187 lb 3.2 oz)   SpO2 94%   BMI 26.86 kg/m²     Gen:    Not in distress  Chest: Nonlabored respiration, Clear lungs  CVS:   Regular rhythm.   No edema  Abd:  Soft, not distended, not tender  Neuro:  Alert, nonfocal, weak    LABS:  Results for Karina Jalloh (MRN [id number]) as of 9/12/2021 14:18   3/11/2009 07:30 9/11/2021 19:50 9/12/2021 11:30   WBC 5.5 12.5 (H) 8.4   NRBC  0.0    RBC 5.03 5.46 4.73   HGB 13.9 16.3 14.0   HCT 41.2 46.5 40.7   MCV 81.9 85.2 86.0   MCH 27.6 29.9 29.6   MCHC 33.7 35.1 34.4   RDW 15.0 (H) 14.2 14.7 (H)   PLATELET 310 199 326 (L)   MPV  10.4 10.0   NEUTROPHILS 57 85 (H)    LYMPHOCYTE 31 8 (L)    MONOCYTES 9 4 (L)    EOSINOPHILS 3 2      Results for Karina Jalloh (MRN [id number]) as of 9/12/2021 14:18   3/10/2009 10:17 9/11/2021 19:50   INR 1.0    Pro time 10.6    D-dimer  19.77 (H)     Results for Karina Jalloh (MRN [id number]) as of 9/12/2021 14:18   9/11/2021 19:50 9/11/2021 23:30 9/12/2021 11:30   Sodium 139     Potassium 3.9     Chloride 101     CO2 27     Anion gap 11     Glucose 181 (H)     BUN 19     Creatinine 1.00     BUN/Cr ratio 19     Calcium 8.4 (L)     GFR  non-AA >60     GFR est AA >60     Bilirubin, total 1.3 (H)     Protein, total 6.4     Albumin 3.6     Globulin 2.8     A-G Ratio 1.3     ALT 28     AST 36     Alk. phos 138 (H)     Troponin-I, Qt. <0.05 <0.05 <0.05     Results for Dakota Hoffman (MRN [id number]) as of 9/12/2021 14:18   9/11/2021 21:10   Influenza A by PCR Not detected   Influenza B by PCR Not detected     Results for Dakota Hoffman (MRN [id number]) as of 9/12/2021 14:18   9/11/2021 21:10   COVID-19 WITH INFLUENZA A/B Not Detected     EKG: NSR 95 bpm, RBBB, No prev     Radiology:  CTA CHEST 9/11:  FINDINGS:  Images are partially degraded secondary to respiratory motion. THYROID: No nodule. MEDIASTINUM: No mass or lymphadenopathy. ANUSHA: No mass or lymphadenopathy. THORACIC AORTA: No dissection or aneurysm. PULMONARY ARTERIES: Normal in caliber. The pulmonary arteries are well enhanced. No evidence of pulmonary embolus. TRACHEA/BRONCHI: Patent. ESOPHAGUS: No wall thickening or dilatation. HEART: Normal in size. Coronary atherosclerotic calcifications are present. PLEURA: No effusion or pneumothorax. LUNGS: No nodule, mass, or airspace disease. INCIDENTALLY IMAGED UPPER ABDOMEN: There is a 9.3 cm cyst in the lateral left  kidney. There is a 3.1 cm cyst in the superior left kidney. There is a 2.0 cm  cyst in the anterior left kidney. The patient is status post cholecystectomy. BONES: No destructive bone lesion.   IMPRESSION  1. Negative for pulmonary embolus. .  2. No evidence of acute process     CXR 9/11:  A portable AP radiograph of the chest was obtained at 1949 hours. .  The lungs are clear. The cardiac and mediastinal contours and pulmonary  vascularity are remarkable for mild tortuosity of the descending aorta.  The left  distal clavicle is surgically absent.   IMPRESSION:  No acute process seen  _______________________________________________________________________  DISCHARGE MEDICATIONS:   Current Discharge Medication List      START taking these medications    Details   nitroglycerin (NITROSTAT) 0.4 mg SL tablet 1 Tablet by SubLINGual route every five (5) minutes as needed for Chest Pain (max of 3 tabs). Up to 3 doses. Qty: 25 Tablet, Refills: 0  Start date: 9/12/2021      guaiFENesin ER (Mucinex) 600 mg ER tablet Take 1 Tablet by mouth two (2) times a day. Qty: 30 Tablet, Refills: 0  Start date: 9/12/2021         CONTINUE these medications which have NOT CHANGED    Details   amLODIPine (Norvasc) 5 mg tablet Take 5 mg by mouth daily. aspirin (ASPIRIN) 325 mg tablet Take 325 mg by mouth daily. tamsulosin (Flomax) 0.4 mg capsule Take 0.4 mg by mouth daily. finasteride (Proscar) 5 mg tablet Take 5 mg by mouth daily. atorvastatin (Lipitor) 80 mg tablet Take 80 mg by mouth daily.              My Recommended  Diet: Cardiac  Activity: Ad Evie  Wound Care: none  Follow-up labs: routine    ______________________________________________________________________  DISPOSITION:    Home with Family: x   Home with HH/PT/OT/RN:    SNF/LTC:    YUKO:    OTHER:        Condition at Discharge:  Stable  _____________________________________________________________________  Follow up with:   PCP : [facility], [facility] On File, MD Barbara El MD  Plan f/u in 3d to plan for Cardiac testing    Total time in minutes spent coordinating this discharge (includes going over instructions, follow-up, prescriptions, and preparing report for sign off to her PCP) :35 minutes    Signed:  Wade Link MD  [facility] Hospitalist  [phone]

## 2021-09-12 NOTE — PROGRESS NOTES
Problem: General Medical Care Plan  Goal: *Vital signs within specified parameters  Outcome: Resolved/Met  Goal: *Labs within defined limits  Outcome: Resolved/Met  Goal: *Absence of infection signs and symptoms  Outcome: Resolved/Met  Goal: *Optimal pain control at patient's stated goal  Outcome: Resolved/Met  Goal: *Skin integrity maintained  Outcome: Resolved/Met  Goal: *Fluid volume balance  Outcome: Resolved/Met  Goal: *Optimize nutritional status  Outcome: Resolved/Met  Goal: *Anxiety reduced or absent  Outcome: Resolved/Met  Goal: *Progressive mobility and function (eg: ADL's)  Outcome: Resolved/Met     Problem: Patient Education: Go to Patient Education Activity  Goal: Patient/Family Education  Outcome: Resolved/Met     Problem: Falls - Risk of  Goal: *Absence of Falls  Description: Document Luz Fall Risk and appropriate interventions in the flowsheet.   Outcome: Resolved/Met  Note: Fall Risk Interventions:  Mobility Interventions: Patient to call before getting OOB         Medication Interventions: Bed/chair exit alarm    Elimination Interventions: Call light in reach    History of Falls Interventions: Bed/chair exit alarm

## 2021-09-12 NOTE — DISCHARGE INSTRUCTIONS
See PCP this week  Discuss possible cardiac testing - ie Nuclear Stress  Trial NTG for c/o CP/SOB  Return for severe symptoms  Trial Mucinex for URI / Mucous c/o  SKYE BENTLEY MD  [phone]  DISCHARGE SUMMARY from Nurse    PATIENT INSTRUCTIONS:    After general anesthesia or intravenous sedation, for 24 hours or while taking prescription Narcotics:  · Limit your activities  · Do not drive and operate hazardous machinery  · Do not make important personal or business decisions  · Do  not drink alcoholic beverages  · If you have not urinated within 8 hours after discharge, please contact your surgeon on call. Report the following to your surgeon:  · Excessive pain, swelling, redness or odor of or around the surgical area  · Temperature over 100.5  · Nausea and vomiting lasting longer than 4 hours or if unable to take medications  · Any signs of decreased circulation or nerve impairment to extremity: change in color, persistent  numbness, tingling, coldness or increase pain  · Any questions    What to do at Home:  Recommended activity: Activity as tolerated,     If you experience any of the following symptoms return of symptoms, please follow up with PCP or return to ED. *  Please give a list of your current medications to your Primary Care Provider. *  Please update this list whenever your medications are discontinued, doses are      changed, or new medications (including over-the-counter products) are added. *  Please carry medication information at all times in case of emergency situations. These are general instructions for a healthy lifestyle:    No smoking/ No tobacco products/ Avoid exposure to second hand smoke  Surgeon General's Warning:  Quitting smoking now greatly reduces serious risk to your health.     Obesity, smoking, and sedentary lifestyle greatly increases your risk for illness    A healthy diet, regular physical exercise & weight monitoring are important for maintaining a healthy lifestyle    You may be retaining fluid if you have a history of heart failure or if you experience any of the following symptoms:  Weight gain of 3 pounds or more overnight or 5 pounds in a week, increased swelling in our hands or feet or shortness of breath while lying flat in bed. Please call your doctor as soon as you notice any of these symptoms; do not wait until your next office visit. The discharge information has been reviewed with the patient and caregiver. The patient and caregiver verbalized understanding. Discharge medications reviewed with the patient and caregiver and appropriate educational materials and side effects teaching were provided.   ___________________________________________________________________________________________________________________________________

## 2021-09-12 NOTE — PROGRESS NOTES
TRANSFER - IN REPORT:    Verbal report received from KAITLIN Mathis RN(name) on [facility]  being received from ED(unit) for routine progression of care      Report consisted of patients Situation, Background, Assessment and   Recommendations(SBAR). Information from the following report(s) ED Summary was reviewed with the receiving nurse. Opportunity for questions and clarification was provided. Assessment completed upon patients arrival to unit and care assumed.

## 2021-09-12 NOTE — ED NOTES
Report given to VEGA Braun RN _RN, all questions answered. Patient transported to , condition unchanged. NAD noted when leaving department.

## 2021-09-12 NOTE — PROGRESS NOTES

## 2021-09-12 NOTE — PROGRESS NOTES
Bedside shift change report given to HILDA Back RN (oncoming nurse) by Beverly Braun RN (offgoing nurse). Report included the following information Kardex.

## 2021-09-13 NOTE — PROGRESS NOTES
Patient in hospital under Observation status with hypoxia 9/12. He was discharged. I attempted to make a follow up phone call to inquire about follow up PCP appointment. There is no recent information in chart about local PCP. Hospital notes mention that patient's son in law is a physician in [place]. Number listed for patient has message \"not allocated\" when called.